# Patient Record
Sex: FEMALE | Race: WHITE | Employment: UNEMPLOYED | URBAN - METROPOLITAN AREA
[De-identification: names, ages, dates, MRNs, and addresses within clinical notes are randomized per-mention and may not be internally consistent; named-entity substitution may affect disease eponyms.]

---

## 2017-05-25 ENCOUNTER — OFFICE VISIT (OUTPATIENT)
Dept: ORTHOPEDIC SURGERY | Age: 47
End: 2017-05-25

## 2017-05-25 VITALS
SYSTOLIC BLOOD PRESSURE: 138 MMHG | WEIGHT: 160 LBS | DIASTOLIC BLOOD PRESSURE: 89 MMHG | HEART RATE: 130 BPM | BODY MASS INDEX: 23.7 KG/M2 | HEIGHT: 69 IN

## 2017-05-25 DIAGNOSIS — S82.811A CLOSED TORUS FRACTURE OF PROXIMAL END OF RIGHT FIBULA, INITIAL ENCOUNTER: Primary | ICD-10-CM

## 2017-05-25 PROCEDURE — 27780 TREATMENT OF FIBULA FRACTURE: CPT | Performed by: ORTHOPAEDIC SURGERY

## 2017-05-25 PROCEDURE — 99203 OFFICE O/P NEW LOW 30 MIN: CPT | Performed by: ORTHOPAEDIC SURGERY

## 2017-05-27 PROBLEM — S82.811A: Status: ACTIVE | Noted: 2017-05-27

## 2017-05-31 ENCOUNTER — TELEPHONE (OUTPATIENT)
Dept: ORTHOPEDIC SURGERY | Age: 47
End: 2017-05-31

## 2017-06-27 ENCOUNTER — TELEPHONE (OUTPATIENT)
Dept: ORTHOPEDIC SURGERY | Age: 47
End: 2017-06-27

## 2017-07-06 ENCOUNTER — OFFICE VISIT (OUTPATIENT)
Dept: ORTHOPEDIC SURGERY | Age: 47
End: 2017-07-06

## 2017-07-06 VITALS — BODY MASS INDEX: 23.7 KG/M2 | HEIGHT: 69 IN | WEIGHT: 160 LBS | RESPIRATION RATE: 16 BRPM | HEART RATE: 120 BPM

## 2017-07-06 DIAGNOSIS — S82.811A CLOSED TORUS FRACTURE OF PROXIMAL END OF RIGHT FIBULA, INITIAL ENCOUNTER: Primary | ICD-10-CM

## 2017-07-06 PROCEDURE — 73562 X-RAY EXAM OF KNEE 3: CPT | Performed by: NURSE PRACTITIONER

## 2017-07-06 PROCEDURE — 99024 POSTOP FOLLOW-UP VISIT: CPT | Performed by: NURSE PRACTITIONER

## 2021-07-15 ENCOUNTER — APPOINTMENT (OUTPATIENT)
Dept: GENERAL RADIOLOGY | Age: 51
DRG: 139 | End: 2021-07-15
Payer: MEDICAID

## 2021-07-15 ENCOUNTER — HOSPITAL ENCOUNTER (INPATIENT)
Age: 51
LOS: 2 days | Discharge: HOME | DRG: 139 | End: 2021-07-17
Attending: EMERGENCY MEDICINE | Admitting: HOSPITALIST
Payer: MEDICAID

## 2021-07-15 ENCOUNTER — APPOINTMENT (OUTPATIENT)
Dept: CT IMAGING | Age: 51
DRG: 139 | End: 2021-07-15
Payer: MEDICAID

## 2021-07-15 DIAGNOSIS — J18.9 PNEUMONIA OF BOTH LOWER LOBES DUE TO INFECTIOUS ORGANISM: Primary | ICD-10-CM

## 2021-07-15 LAB
ANION GAP SERPL CALCULATED.3IONS-SCNC: 16 MMOL/L (ref 3–16)
BASOPHILS ABSOLUTE: 0 K/UL (ref 0–0.2)
BASOPHILS RELATIVE PERCENT: 0.1 %
BUN BLDV-MCNC: 8 MG/DL (ref 7–20)
CALCIUM SERPL-MCNC: 9.1 MG/DL (ref 8.3–10.6)
CHLORIDE BLD-SCNC: 91 MMOL/L (ref 99–110)
CO2: 30 MMOL/L (ref 21–32)
CREAT SERPL-MCNC: 0.8 MG/DL (ref 0.6–1.1)
D DIMER: >1.95 UG/ML FEU
EOSINOPHILS ABSOLUTE: 0.1 K/UL (ref 0–0.6)
EOSINOPHILS RELATIVE PERCENT: 0.7 %
GFR AFRICAN AMERICAN: >60
GFR NON-AFRICAN AMERICAN: >60
GLUCOSE BLD-MCNC: 139 MG/DL (ref 70–99)
HCT VFR BLD CALC: 53.5 % (ref 36–48)
HEMOGLOBIN: 17.8 G/DL (ref 12–16)
LACTIC ACID: 3.8 MMOL/L (ref 0.4–2)
LYMPHOCYTES ABSOLUTE: 1.8 K/UL (ref 1–5.1)
LYMPHOCYTES RELATIVE PERCENT: 19.8 %
MCH RBC QN AUTO: 33 PG (ref 26–34)
MCHC RBC AUTO-ENTMCNC: 33.2 G/DL (ref 31–36)
MCV RBC AUTO: 99.3 FL (ref 80–100)
MONOCYTES ABSOLUTE: 0.6 K/UL (ref 0–1.3)
MONOCYTES RELATIVE PERCENT: 6.6 %
NEUTROPHILS ABSOLUTE: 6.7 K/UL (ref 1.7–7.7)
NEUTROPHILS RELATIVE PERCENT: 72.8 %
PDW BLD-RTO: 15.5 % (ref 12.4–15.4)
PLATELET # BLD: 266 K/UL (ref 135–450)
PMV BLD AUTO: 8.1 FL (ref 5–10.5)
POTASSIUM REFLEX MAGNESIUM: 3.6 MMOL/L (ref 3.5–5.1)
RBC # BLD: 5.38 M/UL (ref 4–5.2)
SODIUM BLD-SCNC: 137 MMOL/L (ref 136–145)
WBC # BLD: 9.2 K/UL (ref 4–11)

## 2021-07-15 PROCEDURE — 6360000002 HC RX W HCPCS: Performed by: EMERGENCY MEDICINE

## 2021-07-15 PROCEDURE — 85379 FIBRIN DEGRADATION QUANT: CPT

## 2021-07-15 PROCEDURE — 2580000003 HC RX 258: Performed by: EMERGENCY MEDICINE

## 2021-07-15 PROCEDURE — 71260 CT THORAX DX C+: CPT

## 2021-07-15 PROCEDURE — 96361 HYDRATE IV INFUSION ADD-ON: CPT

## 2021-07-15 PROCEDURE — 80048 BASIC METABOLIC PNL TOTAL CA: CPT

## 2021-07-15 PROCEDURE — 71046 X-RAY EXAM CHEST 2 VIEWS: CPT

## 2021-07-15 PROCEDURE — 99285 EMERGENCY DEPT VISIT HI MDM: CPT

## 2021-07-15 PROCEDURE — 1200000000 HC SEMI PRIVATE

## 2021-07-15 PROCEDURE — 96365 THER/PROPH/DIAG IV INF INIT: CPT

## 2021-07-15 PROCEDURE — 6360000004 HC RX CONTRAST MEDICATION: Performed by: EMERGENCY MEDICINE

## 2021-07-15 PROCEDURE — G0378 HOSPITAL OBSERVATION PER HR: HCPCS

## 2021-07-15 PROCEDURE — 87040 BLOOD CULTURE FOR BACTERIA: CPT

## 2021-07-15 PROCEDURE — 36415 COLL VENOUS BLD VENIPUNCTURE: CPT

## 2021-07-15 PROCEDURE — 96367 TX/PROPH/DG ADDL SEQ IV INF: CPT

## 2021-07-15 PROCEDURE — 93005 ELECTROCARDIOGRAM TRACING: CPT | Performed by: EMERGENCY MEDICINE

## 2021-07-15 PROCEDURE — 85025 COMPLETE CBC W/AUTO DIFF WBC: CPT

## 2021-07-15 PROCEDURE — 83605 ASSAY OF LACTIC ACID: CPT

## 2021-07-15 RX ORDER — OMEGA-3 FATTY ACIDS/FISH OIL 300-1000MG
400 CAPSULE ORAL EVERY 6 HOURS PRN
COMMUNITY

## 2021-07-15 RX ORDER — ALBUTEROL SULFATE 90 UG/1
2 AEROSOL, METERED RESPIRATORY (INHALATION) EVERY 6 HOURS PRN
COMMUNITY
Start: 2021-06-07

## 2021-07-15 RX ORDER — BUDESONIDE AND FORMOTEROL FUMARATE DIHYDRATE 160; 4.5 UG/1; UG/1
2 AEROSOL RESPIRATORY (INHALATION) 2 TIMES DAILY
COMMUNITY
Start: 2021-06-07

## 2021-07-15 RX ORDER — ASPIRIN 81 MG/1
81 TABLET ORAL DAILY
COMMUNITY

## 2021-07-15 RX ORDER — DULOXETIN HYDROCHLORIDE 60 MG/1
60 CAPSULE, DELAYED RELEASE ORAL DAILY
COMMUNITY
Start: 2021-06-07

## 2021-07-15 RX ORDER — OMEPRAZOLE 40 MG/1
40 CAPSULE, DELAYED RELEASE ORAL DAILY
COMMUNITY
Start: 2021-06-07

## 2021-07-15 RX ORDER — HYDROXYZINE PAMOATE 50 MG/1
50 CAPSULE ORAL 3 TIMES DAILY PRN
COMMUNITY
Start: 2021-06-07

## 2021-07-15 RX ORDER — 0.9 % SODIUM CHLORIDE 0.9 %
1000 INTRAVENOUS SOLUTION INTRAVENOUS ONCE
Status: COMPLETED | OUTPATIENT
Start: 2021-07-15 | End: 2021-07-15

## 2021-07-15 RX ADMIN — CEFTRIAXONE 1000 MG: 1 INJECTION, POWDER, FOR SOLUTION INTRAMUSCULAR; INTRAVENOUS at 23:11

## 2021-07-15 RX ADMIN — SODIUM CHLORIDE 1000 ML: 9 INJECTION, SOLUTION INTRAVENOUS at 21:39

## 2021-07-15 RX ADMIN — AZITHROMYCIN MONOHYDRATE 500 MG: 500 INJECTION, POWDER, LYOPHILIZED, FOR SOLUTION INTRAVENOUS at 23:44

## 2021-07-15 RX ADMIN — IOPAMIDOL 100 ML: 755 INJECTION, SOLUTION INTRAVENOUS at 22:14

## 2021-07-15 ASSESSMENT — PAIN DESCRIPTION - DESCRIPTORS: DESCRIPTORS: ACHING

## 2021-07-15 ASSESSMENT — PAIN DESCRIPTION - LOCATION: LOCATION: HEAD

## 2021-07-15 ASSESSMENT — PAIN DESCRIPTION - PAIN TYPE: TYPE: ACUTE PAIN

## 2021-07-15 ASSESSMENT — PAIN SCALES - GENERAL: PAINLEVEL_OUTOF10: 7

## 2021-07-15 ASSESSMENT — PAIN DESCRIPTION - FREQUENCY: FREQUENCY: CONTINUOUS

## 2021-07-15 NOTE — Clinical Note
Patient Class: Inpatient [101]   REQUIRED: Diagnosis: PNA (pneumonia) [214209]   Estimated Length of Stay: Estimated stay of more than 2 midnights   Admitting Provider: Abilio Mckeon [[id number]]   Preferred Department: covid R/O

## 2021-07-16 LAB
ALBUMIN SERPL-MCNC: 3.6 G/DL (ref 3.4–5)
ALP BLD-CCNC: 141 U/L (ref 40–129)
ALT SERPL-CCNC: 22 U/L (ref 10–40)
ANION GAP SERPL CALCULATED.3IONS-SCNC: 17 MMOL/L (ref 3–16)
AST SERPL-CCNC: 51 U/L (ref 15–37)
BASOPHILS ABSOLUTE: 0.1 K/UL (ref 0–0.2)
BASOPHILS RELATIVE PERCENT: 0.5 %
BILIRUB SERPL-MCNC: 2 MG/DL (ref 0–1)
BILIRUBIN DIRECT: 0.4 MG/DL (ref 0–0.3)
BILIRUBIN, INDIRECT: 1.6 MG/DL (ref 0–1)
BUN BLDV-MCNC: 7 MG/DL (ref 7–20)
CALCIUM SERPL-MCNC: 8.3 MG/DL (ref 8.3–10.6)
CHLORIDE BLD-SCNC: 93 MMOL/L (ref 99–110)
CO2: 26 MMOL/L (ref 21–32)
CREAT SERPL-MCNC: 0.6 MG/DL (ref 0.6–1.1)
EKG ATRIAL RATE: 120 BPM
EKG DIAGNOSIS: NORMAL
EKG P AXIS: 57 DEGREES
EKG P-R INTERVAL: 120 MS
EKG Q-T INTERVAL: 432 MS
EKG QRS DURATION: 74 MS
EKG QTC CALCULATION (BAZETT): 610 MS
EKG R AXIS: 33 DEGREES
EKG T AXIS: 89 DEGREES
EKG VENTRICULAR RATE: 120 BPM
EOSINOPHILS ABSOLUTE: 0 K/UL (ref 0–0.6)
EOSINOPHILS RELATIVE PERCENT: 0.5 %
FERRITIN: 392 NG/ML (ref 15–150)
GFR AFRICAN AMERICAN: >60
GFR NON-AFRICAN AMERICAN: >60
GLUCOSE BLD-MCNC: 106 MG/DL (ref 70–99)
HCT VFR BLD CALC: 49.7 % (ref 36–48)
HEMOGLOBIN: 17.2 G/DL (ref 12–16)
LACTIC ACID: 1.8 MMOL/L (ref 0.4–2)
LACTIC ACID: 3.4 MMOL/L (ref 0.4–2)
LACTIC ACID: 5.1 MMOL/L (ref 0.4–2)
LACTIC ACID: 5.4 MMOL/L (ref 0.4–2)
LYMPHOCYTES ABSOLUTE: 1.2 K/UL (ref 1–5.1)
LYMPHOCYTES RELATIVE PERCENT: 11.4 %
MAGNESIUM: 1 MG/DL (ref 1.8–2.4)
MAGNESIUM: 1.1 MG/DL (ref 1.8–2.4)
MCH RBC QN AUTO: 33.8 PG (ref 26–34)
MCHC RBC AUTO-ENTMCNC: 34.5 G/DL (ref 31–36)
MCV RBC AUTO: 97.7 FL (ref 80–100)
MONOCYTES ABSOLUTE: 0.4 K/UL (ref 0–1.3)
MONOCYTES RELATIVE PERCENT: 4.4 %
NEUTROPHILS ABSOLUTE: 8.4 K/UL (ref 1.7–7.7)
NEUTROPHILS RELATIVE PERCENT: 83.2 %
PDW BLD-RTO: 15.5 % (ref 12.4–15.4)
PLATELET # BLD: 179 K/UL (ref 135–450)
PMV BLD AUTO: 9 FL (ref 5–10.5)
POTASSIUM REFLEX MAGNESIUM: 3.1 MMOL/L (ref 3.5–5.1)
POTASSIUM SERPL-SCNC: 3.4 MMOL/L (ref 3.5–5.1)
PRO-BNP: 1748 PG/ML (ref 0–124)
PROCALCITONIN: 0.15 NG/ML (ref 0–0.15)
RBC # BLD: 5.09 M/UL (ref 4–5.2)
SARS-COV-2, NAAT: NOT DETECTED
SODIUM BLD-SCNC: 136 MMOL/L (ref 136–145)
TOTAL PROTEIN: 7.6 G/DL (ref 6.4–8.2)
WBC # BLD: 10.1 K/UL (ref 4–11)

## 2021-07-16 PROCEDURE — 94640 AIRWAY INHALATION TREATMENT: CPT

## 2021-07-16 PROCEDURE — 85025 COMPLETE CBC W/AUTO DIFF WBC: CPT

## 2021-07-16 PROCEDURE — 6360000002 HC RX W HCPCS: Performed by: INTERNAL MEDICINE

## 2021-07-16 PROCEDURE — 96372 THER/PROPH/DIAG INJ SC/IM: CPT

## 2021-07-16 PROCEDURE — 6370000000 HC RX 637 (ALT 250 FOR IP): Performed by: HOSPITALIST

## 2021-07-16 PROCEDURE — 96366 THER/PROPH/DIAG IV INF ADDON: CPT

## 2021-07-16 PROCEDURE — 82728 ASSAY OF FERRITIN: CPT

## 2021-07-16 PROCEDURE — 83735 ASSAY OF MAGNESIUM: CPT

## 2021-07-16 PROCEDURE — 80048 BASIC METABOLIC PNL TOTAL CA: CPT

## 2021-07-16 PROCEDURE — 83880 ASSAY OF NATRIURETIC PEPTIDE: CPT

## 2021-07-16 PROCEDURE — 96367 TX/PROPH/DG ADDL SEQ IV INF: CPT

## 2021-07-16 PROCEDURE — 1200000000 HC SEMI PRIVATE

## 2021-07-16 PROCEDURE — 2580000003 HC RX 258: Performed by: INTERNAL MEDICINE

## 2021-07-16 PROCEDURE — 6370000000 HC RX 637 (ALT 250 FOR IP): Performed by: INTERNAL MEDICINE

## 2021-07-16 PROCEDURE — 84132 ASSAY OF SERUM POTASSIUM: CPT

## 2021-07-16 PROCEDURE — 6360000002 HC RX W HCPCS: Performed by: HOSPITALIST

## 2021-07-16 PROCEDURE — 87635 SARS-COV-2 COVID-19 AMP PRB: CPT

## 2021-07-16 PROCEDURE — 2580000003 HC RX 258: Performed by: HOSPITALIST

## 2021-07-16 PROCEDURE — 83605 ASSAY OF LACTIC ACID: CPT

## 2021-07-16 PROCEDURE — 96361 HYDRATE IV INFUSION ADD-ON: CPT

## 2021-07-16 PROCEDURE — 36415 COLL VENOUS BLD VENIPUNCTURE: CPT

## 2021-07-16 PROCEDURE — 93010 ELECTROCARDIOGRAM REPORT: CPT | Performed by: INTERNAL MEDICINE

## 2021-07-16 PROCEDURE — 84145 PROCALCITONIN (PCT): CPT

## 2021-07-16 PROCEDURE — 80076 HEPATIC FUNCTION PANEL: CPT

## 2021-07-16 PROCEDURE — 94760 N-INVAS EAR/PLS OXIMETRY 1: CPT

## 2021-07-16 PROCEDURE — G0378 HOSPITAL OBSERVATION PER HR: HCPCS

## 2021-07-16 PROCEDURE — 96375 TX/PRO/DX INJ NEW DRUG ADDON: CPT

## 2021-07-16 RX ORDER — HYDRALAZINE HYDROCHLORIDE 20 MG/ML
10 INJECTION INTRAMUSCULAR; INTRAVENOUS EVERY 6 HOURS PRN
Status: DISCONTINUED | OUTPATIENT
Start: 2021-07-16 | End: 2021-07-17 | Stop reason: HOSPADM

## 2021-07-16 RX ORDER — CEFUROXIME AXETIL 250 MG/1
500 TABLET ORAL EVERY 12 HOURS SCHEDULED
Status: DISCONTINUED | OUTPATIENT
Start: 2021-07-16 | End: 2021-07-17 | Stop reason: HOSPADM

## 2021-07-16 RX ORDER — ONDANSETRON 2 MG/ML
4 INJECTION INTRAMUSCULAR; INTRAVENOUS EVERY 6 HOURS PRN
Status: DISCONTINUED | OUTPATIENT
Start: 2021-07-16 | End: 2021-07-17 | Stop reason: HOSPADM

## 2021-07-16 RX ORDER — CEFUROXIME AXETIL 500 MG/1
500 TABLET ORAL EVERY 12 HOURS SCHEDULED
Qty: 20 TABLET | Refills: 0 | Status: SHIPPED | OUTPATIENT
Start: 2021-07-16 | End: 2021-07-26

## 2021-07-16 RX ORDER — SODIUM CHLORIDE 9 MG/ML
INJECTION, SOLUTION INTRAVENOUS CONTINUOUS
Status: DISCONTINUED | OUTPATIENT
Start: 2021-07-16 | End: 2021-07-17 | Stop reason: HOSPADM

## 2021-07-16 RX ORDER — PANTOPRAZOLE SODIUM 40 MG/1
40 TABLET, DELAYED RELEASE ORAL
Status: DISCONTINUED | OUTPATIENT
Start: 2021-07-16 | End: 2021-07-17 | Stop reason: HOSPADM

## 2021-07-16 RX ORDER — 0.9 % SODIUM CHLORIDE 0.9 %
500 INTRAVENOUS SOLUTION INTRAVENOUS ONCE
Status: COMPLETED | OUTPATIENT
Start: 2021-07-16 | End: 2021-07-16

## 2021-07-16 RX ORDER — SODIUM CHLORIDE 0.9 % (FLUSH) 0.9 %
5-40 SYRINGE (ML) INJECTION PRN
Status: DISCONTINUED | OUTPATIENT
Start: 2021-07-16 | End: 2021-07-17 | Stop reason: HOSPADM

## 2021-07-16 RX ORDER — ASPIRIN 81 MG/1
81 TABLET ORAL DAILY
Status: DISCONTINUED | OUTPATIENT
Start: 2021-07-16 | End: 2021-07-17 | Stop reason: HOSPADM

## 2021-07-16 RX ORDER — ALBUTEROL SULFATE 90 UG/1
2 AEROSOL, METERED RESPIRATORY (INHALATION) EVERY 6 HOURS PRN
Status: DISCONTINUED | OUTPATIENT
Start: 2021-07-16 | End: 2021-07-17 | Stop reason: HOSPADM

## 2021-07-16 RX ORDER — POLYETHYLENE GLYCOL 3350 17 G/17G
17 POWDER, FOR SOLUTION ORAL DAILY PRN
Status: DISCONTINUED | OUTPATIENT
Start: 2021-07-16 | End: 2021-07-17 | Stop reason: HOSPADM

## 2021-07-16 RX ORDER — AMITRIPTYLINE HYDROCHLORIDE 10 MG/1
10 TABLET, FILM COATED ORAL NIGHTLY
Status: DISCONTINUED | OUTPATIENT
Start: 2021-07-16 | End: 2021-07-17 | Stop reason: HOSPADM

## 2021-07-16 RX ORDER — ACETAMINOPHEN 325 MG/1
650 TABLET ORAL EVERY 6 HOURS PRN
Status: DISCONTINUED | OUTPATIENT
Start: 2021-07-16 | End: 2021-07-17 | Stop reason: HOSPADM

## 2021-07-16 RX ORDER — DEXAMETHASONE SODIUM PHOSPHATE 10 MG/ML
6 INJECTION, SOLUTION INTRAMUSCULAR; INTRAVENOUS EVERY 24 HOURS
Status: DISCONTINUED | OUTPATIENT
Start: 2021-07-16 | End: 2021-07-17 | Stop reason: HOSPADM

## 2021-07-16 RX ORDER — MAGNESIUM SULFATE 1 G/100ML
1000 INJECTION INTRAVENOUS PRN
Status: DISCONTINUED | OUTPATIENT
Start: 2021-07-16 | End: 2021-07-17 | Stop reason: HOSPADM

## 2021-07-16 RX ORDER — SODIUM CHLORIDE 9 MG/ML
25 INJECTION, SOLUTION INTRAVENOUS PRN
Status: DISCONTINUED | OUTPATIENT
Start: 2021-07-16 | End: 2021-07-17 | Stop reason: HOSPADM

## 2021-07-16 RX ORDER — HYDROXYZINE PAMOATE 25 MG/1
50 CAPSULE ORAL 3 TIMES DAILY PRN
Status: DISCONTINUED | OUTPATIENT
Start: 2021-07-16 | End: 2021-07-17 | Stop reason: HOSPADM

## 2021-07-16 RX ORDER — POTASSIUM CHLORIDE 7.45 MG/ML
10 INJECTION INTRAVENOUS PRN
Status: DISCONTINUED | OUTPATIENT
Start: 2021-07-16 | End: 2021-07-17 | Stop reason: HOSPADM

## 2021-07-16 RX ORDER — POTASSIUM CHLORIDE 20 MEQ/1
40 TABLET, EXTENDED RELEASE ORAL PRN
Status: DISCONTINUED | OUTPATIENT
Start: 2021-07-16 | End: 2021-07-17 | Stop reason: HOSPADM

## 2021-07-16 RX ORDER — ACETAMINOPHEN 650 MG/1
650 SUPPOSITORY RECTAL EVERY 6 HOURS PRN
Status: DISCONTINUED | OUTPATIENT
Start: 2021-07-16 | End: 2021-07-17 | Stop reason: HOSPADM

## 2021-07-16 RX ORDER — ONDANSETRON 4 MG/1
4 TABLET, ORALLY DISINTEGRATING ORAL EVERY 8 HOURS PRN
Status: DISCONTINUED | OUTPATIENT
Start: 2021-07-16 | End: 2021-07-17 | Stop reason: HOSPADM

## 2021-07-16 RX ORDER — BUDESONIDE AND FORMOTEROL FUMARATE DIHYDRATE 160; 4.5 UG/1; UG/1
2 AEROSOL RESPIRATORY (INHALATION) 2 TIMES DAILY
Status: DISCONTINUED | OUTPATIENT
Start: 2021-07-16 | End: 2021-07-17 | Stop reason: HOSPADM

## 2021-07-16 RX ORDER — SODIUM CHLORIDE 0.9 % (FLUSH) 0.9 %
5-40 SYRINGE (ML) INJECTION EVERY 12 HOURS SCHEDULED
Status: DISCONTINUED | OUTPATIENT
Start: 2021-07-16 | End: 2021-07-17 | Stop reason: HOSPADM

## 2021-07-16 RX ORDER — DULOXETIN HYDROCHLORIDE 60 MG/1
60 CAPSULE, DELAYED RELEASE ORAL DAILY
Status: DISCONTINUED | OUTPATIENT
Start: 2021-07-16 | End: 2021-07-17 | Stop reason: HOSPADM

## 2021-07-16 RX ADMIN — ASPIRIN 81 MG: 81 TABLET, COATED ORAL at 08:26

## 2021-07-16 RX ADMIN — ENOXAPARIN SODIUM 30 MG: 30 INJECTION SUBCUTANEOUS at 08:26

## 2021-07-16 RX ADMIN — DEXAMETHASONE SODIUM PHOSPHATE 6 MG: 10 INJECTION, SOLUTION INTRAMUSCULAR; INTRAVENOUS at 02:21

## 2021-07-16 RX ADMIN — MAGNESIUM SULFATE HEPTAHYDRATE 1000 MG: 1 INJECTION, SOLUTION INTRAVENOUS at 14:10

## 2021-07-16 RX ADMIN — SODIUM CHLORIDE: 9 INJECTION, SOLUTION INTRAVENOUS at 18:28

## 2021-07-16 RX ADMIN — SODIUM CHLORIDE, PRESERVATIVE FREE 10 ML: 5 INJECTION INTRAVENOUS at 10:59

## 2021-07-16 RX ADMIN — HYDRALAZINE HYDROCHLORIDE 10 MG: 20 INJECTION INTRAMUSCULAR; INTRAVENOUS at 13:47

## 2021-07-16 RX ADMIN — POTASSIUM BICARBONATE 40 MEQ: 782 TABLET, EFFERVESCENT ORAL at 12:51

## 2021-07-16 RX ADMIN — PANTOPRAZOLE SODIUM 40 MG: 40 TABLET, DELAYED RELEASE ORAL at 05:26

## 2021-07-16 RX ADMIN — DULOXETINE HYDROCHLORIDE 60 MG: 60 CAPSULE, DELAYED RELEASE ORAL at 08:26

## 2021-07-16 RX ADMIN — BUDESONIDE AND FORMOTEROL FUMARATE DIHYDRATE 2 PUFF: 160; 4.5 AEROSOL RESPIRATORY (INHALATION) at 07:34

## 2021-07-16 RX ADMIN — CEFUROXIME AXETIL 500 MG: 250 TABLET ORAL at 20:11

## 2021-07-16 RX ADMIN — SODIUM CHLORIDE 500 ML: 9 INJECTION, SOLUTION INTRAVENOUS at 12:49

## 2021-07-16 RX ADMIN — ACETAMINOPHEN 650 MG: 325 TABLET ORAL at 02:32

## 2021-07-16 RX ADMIN — MAGNESIUM SULFATE HEPTAHYDRATE 1000 MG: 1 INJECTION, SOLUTION INTRAVENOUS at 12:52

## 2021-07-16 RX ADMIN — MAGNESIUM SULFATE HEPTAHYDRATE 1000 MG: 1 INJECTION, SOLUTION INTRAVENOUS at 18:00

## 2021-07-16 RX ADMIN — ENOXAPARIN SODIUM 30 MG: 30 INJECTION SUBCUTANEOUS at 20:11

## 2021-07-16 RX ADMIN — BUDESONIDE AND FORMOTEROL FUMARATE DIHYDRATE 2 PUFF: 160; 4.5 AEROSOL RESPIRATORY (INHALATION) at 20:51

## 2021-07-16 RX ADMIN — AMITRIPTYLINE HYDROCHLORIDE 10 MG: 10 TABLET, FILM COATED ORAL at 20:11

## 2021-07-16 RX ADMIN — MAGNESIUM SULFATE HEPTAHYDRATE 1000 MG: 1 INJECTION, SOLUTION INTRAVENOUS at 15:27

## 2021-07-16 ASSESSMENT — PAIN SCALES - GENERAL
PAINLEVEL_OUTOF10: 4
PAINLEVEL_OUTOF10: 0
PAINLEVEL_OUTOF10: 3
PAINLEVEL_OUTOF10: 0

## 2021-07-16 ASSESSMENT — PAIN DESCRIPTION - LOCATION: LOCATION: HEAD

## 2021-07-16 ASSESSMENT — PAIN DESCRIPTION - PAIN TYPE: TYPE: ACUTE PAIN

## 2021-07-16 ASSESSMENT — PAIN DESCRIPTION - DESCRIPTORS: DESCRIPTORS: ACHING

## 2021-07-16 NOTE — ED PROVIDER NOTES
eMERGENCY dEPARTMENT eNCOUnter      Pt Name: Wayne Daigle  MRN: [id number]  [place] 1970  Date of evaluation: 7/15/2021  Provider: Hawa Lopez MD     [address]       Chief Complaint   Patient presents with    Shortness of Breath     cough x 2 weeks. Started feeling sob with chills past two day. Hx ASTHMA          HISTORY OF PRESENT ILLNESS   (Location/Symptom, Timing/Onset,Context/Setting, Quality, Duration, Modifying Factors, Severity) Note limiting factors. HPI    Wayne Daigle is a 45 y.o. female who presents to the emergency department with shortness of breath and cough for 2 weeks. Patient states she has been feeling weak and short of breath with chills for the past couple days. Patient is concerned because she has history of asthma and has been having difficulty breathing. There has been no fever. Patient denies any exposure patient however has never been tested for Covid and has never received the Covid vaccine. Patient presents with tachycardia afebrile with some hypotension. Patient has comorbidity of the cardiac surgery secondary to a aortic dissection and also patient had a previous brain aneurysm that was repair about four 5 years ago. Patient states she is been using her inhalers without relief. She denies any exposure to Covid. No family members have been sick. Nursing Notes were reviewed. REVIEW OFSYSTEMS    (2+ for level 4; 10+ for level 5)   Review of Systems    General: No fevers, positive chills or night sweats, No weight loss    Head:  No Sore throat,  No Ear Pain    Chest:  Nontender. Positive dry nonproductive cough, positive SOB, no chest Pain    GI: No abdominal pain or vomiting    : No dysuria or hematuria    Musculoskeletal: No unrelenting pain or night pain    Neurologic: No bowel or bladder incontinence, No saddle anesthesia, No leg weakness    All other systems reviewed and are negative.         PAST MEDICAL HISTORY     Past Medical History: Diagnosis Date    Asthma     Headache     Hypertension        SURGICAL HISTORY       Past Surgical History:   Procedure Laterality Date    BRAIN SURGERY      CARDIAC SURGERY      FRACTURE SURGERY Left     Ankle    TUBAL LIGATION         CURRENT MEDICATIONS       Previous Medications    ALBUTEROL SULFATE  (90 BASE) MCG/ACT INHALER    Inhale 2 puffs into the lungs every 6 hours as needed for Wheezing    ALBUTEROL SULFATE  (90 BASE) MCG/ACT INHALER    Inhale 2 puffs into the lungs every 6 hours as needed    AMITRIPTYLINE (ELAVIL) 10 MG TABLET    Take 10 mg by mouth nightly    ASPIRIN 81 MG EC TABLET    Take 81 mg by mouth daily    BUDESONIDE-FORMOTEROL (SYMBICORT) 160-4.5 MCG/ACT AERO    Inhale 2 puffs into the lungs 2 times daily    DULOXETINE (CYMBALTA) 60 MG EXTENDED RELEASE CAPSULE    Take 60 mg by mouth daily    HYDROXYZINE (VISTARIL) 50 MG CAPSULE    Take 50 mg by mouth 3 times daily as needed    IBUPROFEN (ADVIL;MOTRIN) 200 MG CAPS    Take 400 mg by mouth every 6 hours as needed    OMEPRAZOLE (PRILOSEC) 40 MG DELAYED RELEASE CAPSULE    Take 40 mg by mouth daily       ALLERGIES     Eggs or egg-derived products and Penicillins    FAMILY HISTORY     History reviewed. No pertinent family history.      SOCIAL HISTORY       Social History     Socioeconomic History    Marital status:      Spouse name: None    Number of children: None    Years of education: None    Highest education level: None   Occupational History    Occupation: Nurse Aide   Tobacco Use    Smoking status: Former Smoker     Packs/day: 0.50     Types: Cigarettes     Quit date: 2021     Years since quittin.1    Smokeless tobacco: Never Used   Substance and Sexual Activity    Alcohol use: Yes     Comment: 2 DAYS WEEK     Drug use: No    Sexual activity: None   Other Topics Concern    None   Social History Narrative    None     Social Determinants of Health     Financial Resource Strain:     Difficulty of Paying Living Expenses:    Food Insecurity:     Worried About Running Out of Food in the Last Year:     [address] in the Last Year:    Transportation Needs:     Lack of Transportation (Medical):  Lack of Transportation (Non-Medical):    Physical Activity:     Days of Exercise per Week:     Minutes of Exercise per Session:    Stress:     Feeling of Stress :    Social Connections:     Frequency of Communication with Friends and Family:     Frequency of Social Gatherings with Friends and Family:     Attends Voodoo Services:     Active Member of Clubs or Organizations:     Attends Club or Organization Meetings:     Marital Status:    Intimate Partner Violence:     Fear of Current or Ex-Partner:     Emotionally Abused:     Physically Abused:     Sexually Abused:        SCREENINGS    Fatoumata Coma Scale  Eye Opening: Spontaneous  Best Verbal Response: Oriented  Best Motor Response: Obeys commands  Fatoumata Coma Scale Score: 15      PHYSICAL EXAM    (up to 7 for level 4, 8 or more for level 5)     ED Triage Vitals [07/15/21 2048]   BP Temp Temp Source Pulse Resp SpO2 Height Weight   (!) 170/125 98.6 °F (37 °C) Oral 122 24 96 % 5' 7\" (1.702 m) 169 lb 12.1 oz (77 kg)       Physical Exam    General: Alert and awake ×3. Nontoxic appearance. Well-developed well-nourished in moderate respiratory distress. HEENT: Normocephalic atraumatic. Neck is supple. Airway intact. No adenopathy  Cardiac: Rapid rate and normal rhythm with no murmurs rubs or gallops  Pulmonary: Lungs are clear in all lung fields but I hear some bibasilar rhonchi. No wheezing. No Rales. Abdomen: Soft and nontender. Negative hepatosplenomegaly. Bowel sounds are active  Extremities: Moving all extremities. No calf tenderness. Peripheral pulses all intact. No peripheral edema. No calf tenderness. Skin: No skin lesions. No rashes  Neurologic: Cranial nerves II through XII was grossly intact.   Nonfocal neurological exam  Psychiatric: Patient is pleasant. Mood is appropriate. DIAGNOSTIC RESULTS     EKG (Per Emergency Physician):     Sinus tachycardia at a rate of 120 nonspecific ST-T wave changes no ischemia noted. RADIOLOGY (Per Emergency Physician): Interpretation per the Radiologist below, if available at the time of this note:  XR CHEST (2 VW)    Result Date: 7/15/2021  EXAMINATION: TWO XRAY VIEWS OF THE CHEST 7/15/2021 9:21 pm COMPARISON: 08/21/2017 HISTORY: ORDERING SYSTEM PROVIDED HISTORY: SOB TECHNOLOGIST PROVIDED HISTORY: Reason for exam:->SOB Reason for Exam: sob Acuity: Acute Type of Exam: Initial Relevant Medical/Surgical History: asthma, aortic repair sx FINDINGS: Sternotomy wires are present. Cardiac silhouette is normal.  Thoracic aortic contour is stable. Sussy are normal.  There is no focal airspace disease or pleural effusion. No acute cardiopulmonary disease. CT CHEST PULMONARY EMBOLISM W CONTRAST    Result Date: 7/15/2021  EXAMINATION: CTA OF THE CHEST 7/15/2021 10:14 pm TECHNIQUE: CTA of the chest was performed after the administration of intravenous contrast.  Multiplanar reformatted images are provided for review. MIP images are provided for review. Dose modulation, iterative reconstruction, and/or weight based adjustment of the mA/kV was utilized to reduce the radiation dose to as low as reasonably achievable.  COMPARISON: PA and lateral chest from 07/15/2021 HISTORY: ORDERING SYSTEM PROVIDED HISTORY: SOB TECHNOLOGIST PROVIDED HISTORY: Reason for exam:->SOB Decision Support Exception - unselect if not a suspected or confirmed emergency medical condition->Emergency Medical Condition (MA) Reason for Exam: sob, elevated D-dimer Acuity: Acute Type of Exam: Initial Relevant Medical/Surgical History: asthma, aorta repair sx 67-year-old female with shortness of breath and elevated D-dimer FINDINGS: Pulmonary Arteries: No obvious filling defect in the pulmonary arterial vasculature that meets the criteria for pulmonary embolus. Mediastinum: Aortic valvular prosthesis. Prior median sternotomy and CABG. Atheromatous plaque and atherosclerotic calcification of the thoracic aorta and branch vasculature. Visualized thyroid gland grossly unremarkable in appearance. No periaortic or mediastinal hemorrhage. Coronary artery disease. No pericardial or pleural effusions. No axillary, mediastinal, or hilar lymphadenopathy. Calcified mediastinal and right hilar lymph nodes. Calcified subcarinal lymph nodes. Rim of hyperdensity surrounding the ascending thoracic aorta likely related to prior aortic repair surgery. Lungs/pleura: Trachea and proximal central airways appear patent. Mild biapical pleuroparenchymal scarring. Scattered ground-glass and tree-in-bud opacities within the right upper lobe. No pneumothorax. Tree-in-bud opacities are seen within the medial right lower lobe with ground-glass opacities in the upper/superior segment of the right lower lobe. Respiratory motion is seen throughout the left lung. Left lung is relatively clear. Upper Abdomen: Diffuse fatty infiltration of the liver. Atherosclerotic calcification of the upper abdominal aorta and branch vasculature. Small hiatal hernia. Soft Tissues/Bones: Mild diffuse degenerative changes throughout the spine. 1. Findings above consistent with multifocal pneumonia, essentially within the right lung. Follow-up is recommended to document resolution. 2. No clear evidence for central pulmonary embolus. 3. Aortic valvular prosthesis. Evidence of prior aortic repair. Prior median sternotomy and CABG. Coronary artery disease. Atheromatous plaque and atherosclerotic calcification of the aorta and branch vasculature. 4. Old granulomatous disease. 5. Fatty liver. 6. Small hiatal hernia.        ED BEDSIDE ULTRASOUND:   Performed by ED Physician - none    LABS:  Labs Reviewed   CBC WITH AUTO DIFFERENTIAL - Abnormal; Notable for the following components:       Result Value    RBC 5.38 (*)     Hemoglobin 17.8 (*)     Hematocrit 53.5 (*)     RDW 15.5 (*)     All other components within normal limits    Narrative:     Performed at:  [facility]  [address]   Phone [phone]   BASIC METABOLIC PANEL W/ REFLEX TO MG FOR LOW K - Abnormal; Notable for the following components:    Chloride 91 (*)     Glucose 139 (*)     All other components within normal limits    Narrative:     Performed at:  [facility]  [address]   Phone [phone]   D-DIMER, QUANTITATIVE - Abnormal; Notable for the following components:    D-Dimer, Quant >1.95 (*)     All other components within normal limits    Narrative:     Performed at:  [facility]  [address]   Phone [phone]   LACTIC ACID, PLASMA - Abnormal; Notable for the following components:    Lactic Acid 3.8 (*)     All other components within normal limits    Narrative:     Performed at:  [facility]  [address]   Phone [phone]   CULTURE, BLOOD 1   CULTURE, BLOOD 2        All other labs were within normal range or not returned as of this dictation.       Procedures      EMERGENCY DEPARTMENT COURSE and DIFFERENTIAL DIAGNOSIS/MDM:   Vitals:    Vitals:    07/15/21 2200 07/15/21 2230 07/15/21 2330 07/16/21 0000   BP: (!) 153/100 (!) 145/108 (!) 160/111 (!) 160/125   Pulse: 101 112 97 107   Resp: 13 17 15 16   Temp:       TempSrc:       SpO2: 97% 96% 98% 96%   Weight:       Height:           Medications   azithromycin (ZITHROMAX) 500 mg in D5W 250ml addavial (500 mg Intravenous New Bag 7/15/21 2344)   0.9 % sodium chloride bolus (0 mLs Intravenous Stopped 7/15/21 2256)   iopamidol (ISOVUE-370) 76 % injection 100 mL (100 mLs Intravenous Given 7/15/21 3528)   cefTRIAXone (ROCEPHIN) 1000 mg IVPB in 50 mL D5W minibag (0 mg Intravenous Stopped 7/15/21 4040)       MDM. Patient is 79-year-old female been sick for couple weeks but for the past couple days been short of breath. His exam is consistent for a pneumonia I hear some rhonchi noted. Her chest x-ray was clear however the CAT scan that was obtained because I was concerned for PE with elevated D-dimer did not show any evidence of obstruction or PE but did show multifocal pneumonia consistent with some groundglass appearance. Given she has never been tested and have not received the vaccine she may be at risk for Covid pneumonia. Patient is not hypoxic she still tachycardic her lactic acid is elevated at 3.8. Patient given a liter of fluids cardio remains blood pressure is elevated patient is not on any antihypertensive. Patient does have history of hypertension though. We will discuss with hospitalist for admission and further treatment. Cultures have been obtained patient will be started on Zithromax and Rocephin at this time. Patient will probably need to be tested for Covid when she gets over to our [address]:         CRITICAL CARE TIME   Total CriticalCare time was 15 minutes, excluding separately reportable procedures. There was a high probability of clinically significant/life threatening deterioration in the patient's condition which required my urgent intervention. CONSULTS:  None    PROCEDURES:  Unless otherwise noted below, none     [unfilled]    FINAL IMPRESSION      1. Pneumonia of both lower lobes due to infectious organism          DISPOSITION/PLAN   DISPOSITION Admitted 07/15/2021 11:51:12 PM      PATIENT REFERRED TO:  No follow-up provider specified.     DISCHARGE MEDICATIONS:  New Prescriptions    No medications on file          (Please note:  Portions of this note were completed with a voice recognition program.Efforts were made to edit the dictations but occasionally words and phrases are mis-transcribed.)  Form v2016. J.5-cn    [employer] Courser MD ANNMARIE (electronically signed)  Emergency Medicine Provider        Bienvenido Shelton MD  07/16/21 2912

## 2021-07-16 NOTE — PROGRESS NOTES
Physician Progress Note      Mick Mann  CSN #:                  [id number]  :                       1970  ADMIT DATE:       7/15/2021 8:59 PM  [address] [place] DATE:  RESPONDING  PROVIDER #:        Lennie Caba MD          QUERY TEXT:    Patient admitted with pneumonia. Noted documentation of SIRS in H&P. Please   document if the diagnosis of SIRS has been ruled out after further study. The medical record reflects the following:  Risk Factors: none identified  Clinical Indicators: pneumonia, on arrival T 98.6 - P 122 - R 24, lactic acid   3.8, Procalcitonin 0.15, WBC 9.2, COVID not detected  Treatment: pneumonia being treated with Rocephin and Decadron    Thank you,  Casper Denny RN, BSN, CCDS  [email or web address]  Options provided:  -- SIRS was ruled out  -- Other - I will add my own diagnosis  -- Disagree - Not applicable / Not valid  -- Disagree - Clinically unable to determine / Unknown  -- Refer to Clinical Documentation Reviewer    PROVIDER RESPONSE TEXT:    SIRS was ruled out after study.     Query created by: Xavier Duvall on 2021 9:09 AM      Electronically signed by:  Lennie Caba MD 2021 1:38 PM

## 2021-07-16 NOTE — PROGRESS NOTES
Patient seen and evaluated at the bedside - continue po cefuroxime, IV fluids, discharge likely tomorrow

## 2021-07-16 NOTE — CARE COORDINATION
INITIAL CASE MANAGEMENT ASSESSMENT    Reviewed chart, met with patient to assess possible discharge needs. Explained Case Management role/services. The SW Student talked to patient via telephone. Living Situation: The patient confirmed address. The patient states that she lives alone in an apartment . She has no pets and she does not uses any steps before entering her home. ADLs: independent      DME: The patient has a walker but she does not use it. PT/OT Recs: Prior to medical admission, the patient was walking for not long periods of time. The patient refused physical therapy suggestion. Active Services: The patient has no active services at this time. Transportation: The patient is not an active  and the patient states that her  daughter will be picking up the patient at discharge. Medications: The patient states that she gets her medications from [address] [place] [address]. The patient has no issues with affordability but she states that sometimes she has difficulty with getting there. PCP: GERARDO Almeida CNP      HD/PD: N/A     PLAN/COMMENTS: 1)Give patient  Pharmacy list for delivery. 2) Discharge Home. SW/CM provided contact information for patient or family to call with any questions. SW/CM will follow and assist as needed.     Vesta Perez (MSW intern)  [facility] [place]  Phone [phone]  Fax [phone]  Electronically signed by Adelaide Harris on 7/16/2021 at 12:28 PM

## 2021-07-16 NOTE — PROGRESS NOTES
CLINICAL PHARMACY NOTE: MEDS TO BEDS    Total # of Prescriptions Filled: 1   The following medications were delivered to the patient:  Current Discharge Medication List      START taking these medications    Details   cefUROXime (CEFTIN) 500 MG tablet Take 1 tablet by mouth every 12 hours for 10 days  Qty: 20 tablet, Refills: 0         ·   ·     Additional Documentation:  Med in inpatient pharmacy for weekend delivery

## 2021-07-16 NOTE — ED NOTES
Nurse tried to clarify patient drinking status and when she last took b/p meds. Patient reports her last drink was 3 days ago. She denies a drinking problem. She states, Aneesh Carpenter took me off my blood pressure medication before my open heart because my blood pressure went real low. \"     Rajan Gill RN  07/16/21 [employer] [place], RN  07/16/21 9901

## 2021-07-16 NOTE — ED NOTES
Patient reports her blood pressure is always high. The doctor took her off her blood pressure medications.       Karen Aparicio RN  07/16/21 6904

## 2021-07-16 NOTE — PROGRESS NOTES
Pt admitted to 4250 from [facility] ED. Pt is A&O x 4, denies SOB. Pt c/o headache. /118 . Secure message sent to Dr. Ziggy Zamudio. Pt oriented to room, call light, fall precautions and POC. Tele box #117 placed and verified with [facility]. Call light and needs in reach.   Electronically signed by Greg Starkey RN on 7/16/2021 at 2:40 AM

## 2021-07-16 NOTE — PROGRESS NOTES
4 Eyes Skin Assessment     NAME:  Melody Rondi Goltz OF BIRTH:  1970  MEDICAL RECORD NUMBER:  [id number]    The patient is being assess for  Admission    I agree that 2 RN's have performed a thorough Head to Toe Skin Assessment on the patient. ALL assessment sites listed below have been assessed. Areas assessed by both nurses:    Head, Face, Ears, Shoulders, Back, Chest, Arms, Elbows, Hands, Sacrum. Buttock, Coccyx, Ischium and Legs. Feet and Heels        Does the Patient have a Wound?  No noted wound(s)       Bienvenido Prevention initiated:  No   Wound Care Orders initiated:  No    Pressure Injury (Stage 3,4, Unstageable, DTI, NWPT, and Complex wounds) if present place consult order under [de-identified] No    New and Established Ostomies if present place consult order under : No      Nurse 1 eSignature: Electronically signed by Rose Marie Correa RN on 7/16/21 at 6:04 AM EDT    **SHARE this note so that the co-signing nurse is able to place an eSignature**    Nurse 2 eSignature: Electronically signed by Emily Denver, RN on 7/16/21 at 6:12 AM EDT

## 2021-07-16 NOTE — H&P
Hospital Medicine History & Physical      PCP: Erick Fall MD    Date of Admission: 7/15/2021    Date of Service: Pt seen/examined on 7/15/2021 and Admitted to Inpatient with expected LOS greater than two midnights due to medical therapy     Chief Complaint:  SOB      History Of Present Illness:       45 y.o. female presents with a 2 week history of a dry cough, fatigue. She denies chest pain, calf pain, leg swelling. 2 days ago she noted onset of chills, felt feverish with nausea non bilious/bloody emesis, anorexia and watery brown diarrhea. She denies abdominal pain. She presented to the ER tachycardic, sob, currently improved/ stable on room air. Past Medical History:          Diagnosis Date    Alcohol abuse     Anxiety     Aortic aneurysm with dissection (HCC)     repair done 3/20/18    Asthma     Brain aneurysm     with repair 5/20/16    Headache     Hypertension        Past Surgical History:          Procedure Laterality Date    BRAIN SURGERY      CARDIAC SURGERY      FRACTURE SURGERY Left     Ankle    TUBAL LIGATION         Medications Prior to Admission:      Prior to Admission medications    Medication Sig Start Date End Date Taking?  Authorizing Provider   aspirin 81 MG EC tablet Take 81 mg by mouth daily   Yes Historical Provider, MD   budesonide-formoterol (SYMBICORT) 160-4.5 MCG/ACT AERO Inhale 2 puffs into the lungs 2 times daily 6/7/21  Yes Historical Provider, MD   DULoxetine (CYMBALTA) 60 MG extended release capsule Take 60 mg by mouth daily 6/7/21  Yes Historical Provider, MD   hydrOXYzine (VISTARIL) 50 MG capsule Take 50 mg by mouth 3 times daily as needed 6/7/21  Yes Historical Provider, MD   omeprazole (PRILOSEC) 40 MG delayed release capsule Take 40 mg by mouth daily 6/7/21  Yes Historical Provider, MD   amitriptyline (ELAVIL) 10 MG tablet Take 10 mg by mouth nightly   Yes Historical Provider, MD   ibuprofen (ADVIL;MOTRIN) 200 MG CAPS Take 400 mg by mouth every 6 hours as needed    Historical Provider, MD   albuterol sulfate  (90 Base) MCG/ACT inhaler Inhale 2 puffs into the lungs every 6 hours as needed 6/7/21   Historical Provider, MD   albuterol sulfate  (90 BASE) MCG/ACT inhaler Inhale 2 puffs into the lungs every 6 hours as needed for Wheezing    Historical Provider, MD       Allergies:  Eggs or egg-derived products and Penicillins    Social History:           TOBACCO:   reports that she quit smoking about 5 weeks ago. Her smoking use included cigarettes. She smoked 0.50 packs per day. She has never used smokeless tobacco.  ETOH:   reports current alcohol use. Family History:           History reviewed. No pertinent family history. REVIEW OF SYSTEMS:   Pertinent positives as noted in the HPI. All other systems reviewed and negative. PHYSICAL EXAM PERFORMED:    BP (!) 176/120   Pulse 99   Temp 98.6 °F (37 °C) (Oral)   Resp 18   Ht 5' 7\" (1.702 m)   Wt 168 lb 14 oz (76.6 kg)   SpO2 96%   BMI 26.45 kg/m²     General appearance:  No apparent distress, appears stated age and cooperative. HEENT:  Normal cephalic, atraumatic without obvious deformity. Pupils equal, round, and  xtra ocular muscles intact. Conjunctivae/corneas clear. Neck: Supple, with full range of motion. No jugular venous distention. Trachea midline. Respiratory:  Normal respiratory effort. No use o faccessory muscles, no intercostal retractions  Cardiovascular:  Regular rate and rhythm , no ectopy  Abdomen: Soft, non-tender, non-distended with normal bowel sounds. Musculoskeletal:  No clubbing, cyanosis or edema bilaterally. No calf tenderenss  Skin: Skin color, texture, turgor normal   Neurologic:   Neurovascularly intact without any focal sensory/motor deficits.  Cranial nerves: II-XII intact, grossly non-focal.  Psychiatric:  Alert and oriented, thought content appropriate, normal insight         Labs:     Recent Labs     07/15/21  2134 07/16/21  0433   WBC 9.2 10.1   HGB 17.8* 17.2*   HCT 53.5* 49.7*    179     Recent Labs     07/15/21  2134      K 3.6   CL 91*   CO2 30   BUN 8   CREATININE 0.8   CALCIUM 9.1     No results for input(s): AST, ALT, BILIDIR, BILITOT, ALKPHOS in the last 72 hours. No results for input(s): INR in the last 72 hours. No results for input(s): Evangelista Breeze in the last 72 hours. Urinalysis:      Lab Results   Component Value Date    NITRU Negative 08/21/2017    WBCUA 6-10 11/03/2011    BACTERIA Rare 11/03/2011    RBCUA TNTC (H) 11/03/2011    BLOODU Negative 08/21/2017    SPECGRAV 1.025 08/21/2017    GLUCOSEU Negative 08/21/2017    GLUCOSEU Negative 11/03/2011       Radiology:          CT CHEST PULMONARY EMBOLISM W CONTRAST   Final Result   1. Findings above consistent with multifocal pneumonia, essentially within   the right lung. Follow-up is recommended to document resolution. 2. No clear evidence for central pulmonary embolus. 3. Aortic valvular prosthesis. Evidence of prior aortic repair. Prior   median sternotomy and CABG. Coronary artery disease. Atheromatous plaque   and atherosclerotic calcification of the aorta and branch vasculature. 4. Old granulomatous disease. 5. Fatty liver. 6. Small hiatal hernia. XR CHEST (2 VW)   Final Result   No acute cardiopulmonary disease. ASSESSMENT:    Active Hospital Problems    Diagnosis Date Noted    Pneumonia [J18.9] 07/15/2021    PNA (pneumonia) [J18.9] 07/15/2021         PLAN:    SIRS  - r/o covid, remains on room air  - CAP abx coverage  - decadron x 1 given  - trend lactic acid    CAD  - clinically stable, no chest pain      DVT Prophylaxis: lovenox  Diet: ADULT DIET; Regular  Code Status: Full Aston Schneider MD    Thank you Barbara Berry MD for the opportunity to be involved in this patient's care. If you have any questions or concerns please feel free to contact me at [phone].

## 2021-07-16 NOTE — ED NOTES
Report called to [address] [place] [address] on 81 Gay Kapoor here to take patient to [facility] AT [address]  07/16/21 0457

## 2021-07-16 NOTE — ED NOTES
Dr. Tigre Funk called back talked with Dr. Naima Curtis. Patient is accepted at [facility] AT [place]. Patient will be going to room 5121.       Александр Monroy RN  07/15/21 3190

## 2021-07-16 NOTE — ED NOTES
Talked with Uriel Jeff at [facility]. She is moving patient to room 4250.  ETA 1  Blaise Weldon, RN  07/16/21 3704

## 2021-07-16 NOTE — PLAN OF CARE
Problem: Pain:  Goal: Pain level will decrease  Description: Pain level will decrease  7/16/2021 1331 by Kavitha Rojo RN  Outcome: Ongoing     Problem: Pain:  Goal: Control of acute pain  Description: Control of acute pain  7/16/2021 1331 by Kavitha Rojo RN  Outcome: Ongoing     Problem: Pain:  Goal: Control of chronic pain  Description: Control of chronic pain  7/16/2021 1331 by Kavitha Rojo RN  Outcome: Ongoing     Problem: Falls - Risk of:  Goal: Will remain free from falls  Description: Will remain free from falls  7/16/2021 1331 by Kavitha Rojo RN  Outcome: Ongoing     Problem: Falls - Risk of:  Goal: Absence of physical injury  Description: Absence of physical injury  7/16/2021 1331 by Kavitha Rojo RN  Outcome: Ongoing     Problem: Discharge Planning:  Goal: Discharged to appropriate level of care  Description: Discharged to appropriate level of care  7/16/2021 1331 by Kavitha Rojo RN  Outcome: Ongoing     Problem: Discharge Planning:  Goal: Participates in care planning  Description: Participates in care planning  7/16/2021 1331 by Kavitha Rojo RN  Outcome: Ongoing     Problem: Airway Clearance - Ineffective:  Goal: Clear lung sounds  Description: Clear lung sounds  7/16/2021 1331 by Kavitha Rojo RN  Outcome: Ongoing

## 2021-07-16 NOTE — PROGRESS NOTES
Medication Reconciliation    List of medications patient is currently taking is complete. Source of information: 1. Conversation with patient                                      2. EPIC records      Allergies  Eggs or egg-derived products and Penicillins     Notes regarding home medications:   1. Amitriptyline dose updated to 50 mg nightly.

## 2021-07-16 NOTE — PLAN OF CARE
Problem: Pain:  Goal: Pain level will decrease  Description: Pain level will decrease  Outcome: Ongoing  Goal: Control of acute pain  Description: Control of acute pain  Outcome: Ongoing  Goal: Control of chronic pain  Description: Control of chronic pain  Outcome: Ongoing     Problem: Falls - Risk of:  Goal: Will remain free from falls  Description: Will remain free from falls  Outcome: Ongoing  Goal: Absence of physical injury  Description: Absence of physical injury  Outcome: Ongoing     Problem: Discharge Planning:  Goal: Discharged to appropriate level of care  Description: Discharged to appropriate level of care  Outcome: Ongoing  Goal: Participates in care planning  Description: Participates in care planning  Outcome: Ongoing     Problem: Airway Clearance - Ineffective:  Goal: Clear lung sounds  Description: Clear lung sounds  Outcome: Ongoing

## 2021-07-16 NOTE — ED NOTES
Patient denies any alcohol abuse. She told nurse she drinks 2 x week. Noted in her past encounters she has history of alcohol abuse.  She reported she quit drinking Jan 1, 2021      Bettye Luna RN  07/16/21 7080

## 2021-07-17 ENCOUNTER — APPOINTMENT (OUTPATIENT)
Dept: GENERAL RADIOLOGY | Age: 51
DRG: 139 | End: 2021-07-17
Payer: MEDICAID

## 2021-07-17 VITALS
RESPIRATION RATE: 18 BRPM | HEART RATE: 95 BPM | SYSTOLIC BLOOD PRESSURE: 140 MMHG | BODY MASS INDEX: 27.92 KG/M2 | WEIGHT: 177.91 LBS | HEIGHT: 67 IN | TEMPERATURE: 98.1 F | OXYGEN SATURATION: 95 % | DIASTOLIC BLOOD PRESSURE: 82 MMHG

## 2021-07-17 LAB
ANION GAP SERPL CALCULATED.3IONS-SCNC: 14 MMOL/L (ref 3–16)
BUN BLDV-MCNC: 5 MG/DL (ref 7–20)
CALCIUM SERPL-MCNC: 7.7 MG/DL (ref 8.3–10.6)
CHLORIDE BLD-SCNC: 98 MMOL/L (ref 99–110)
CO2: 22 MMOL/L (ref 21–32)
CREAT SERPL-MCNC: 0.6 MG/DL (ref 0.6–1.1)
GFR AFRICAN AMERICAN: >60
GFR NON-AFRICAN AMERICAN: >60
GLUCOSE BLD-MCNC: 122 MG/DL (ref 70–99)
HCT VFR BLD CALC: 44 % (ref 36–48)
HEMOGLOBIN: 15.3 G/DL (ref 12–16)
LACTIC ACID: 3.8 MMOL/L (ref 0.4–2)
MCH RBC QN AUTO: 34.3 PG (ref 26–34)
MCHC RBC AUTO-ENTMCNC: 34.7 G/DL (ref 31–36)
MCV RBC AUTO: 98.9 FL (ref 80–100)
PDW BLD-RTO: 15.4 % (ref 12.4–15.4)
PLATELET # BLD: 195 K/UL (ref 135–450)
PMV BLD AUTO: 8.7 FL (ref 5–10.5)
POTASSIUM REFLEX MAGNESIUM: 3.6 MMOL/L (ref 3.5–5.1)
RBC # BLD: 4.45 M/UL (ref 4–5.2)
SODIUM BLD-SCNC: 134 MMOL/L (ref 136–145)
WBC # BLD: 7.4 K/UL (ref 4–11)

## 2021-07-17 PROCEDURE — 96361 HYDRATE IV INFUSION ADD-ON: CPT

## 2021-07-17 PROCEDURE — 96372 THER/PROPH/DIAG INJ SC/IM: CPT

## 2021-07-17 PROCEDURE — 71045 X-RAY EXAM CHEST 1 VIEW: CPT

## 2021-07-17 PROCEDURE — G0378 HOSPITAL OBSERVATION PER HR: HCPCS

## 2021-07-17 PROCEDURE — 94760 N-INVAS EAR/PLS OXIMETRY 1: CPT

## 2021-07-17 PROCEDURE — 85027 COMPLETE CBC AUTOMATED: CPT

## 2021-07-17 PROCEDURE — 6370000000 HC RX 637 (ALT 250 FOR IP): Performed by: HOSPITALIST

## 2021-07-17 PROCEDURE — 36415 COLL VENOUS BLD VENIPUNCTURE: CPT

## 2021-07-17 PROCEDURE — 6370000000 HC RX 637 (ALT 250 FOR IP): Performed by: INTERNAL MEDICINE

## 2021-07-17 PROCEDURE — 83605 ASSAY OF LACTIC ACID: CPT

## 2021-07-17 PROCEDURE — 6360000002 HC RX W HCPCS: Performed by: HOSPITALIST

## 2021-07-17 PROCEDURE — 2580000003 HC RX 258: Performed by: INTERNAL MEDICINE

## 2021-07-17 PROCEDURE — 80048 BASIC METABOLIC PNL TOTAL CA: CPT

## 2021-07-17 PROCEDURE — 6360000002 HC RX W HCPCS: Performed by: INTERNAL MEDICINE

## 2021-07-17 PROCEDURE — 96376 TX/PRO/DX INJ SAME DRUG ADON: CPT

## 2021-07-17 PROCEDURE — 94640 AIRWAY INHALATION TREATMENT: CPT

## 2021-07-17 RX ADMIN — DEXAMETHASONE SODIUM PHOSPHATE 6 MG: 10 INJECTION, SOLUTION INTRAMUSCULAR; INTRAVENOUS at 00:46

## 2021-07-17 RX ADMIN — HYDRALAZINE HYDROCHLORIDE 10 MG: 20 INJECTION INTRAMUSCULAR; INTRAVENOUS at 14:11

## 2021-07-17 RX ADMIN — ACETAMINOPHEN 650 MG: 325 TABLET ORAL at 08:31

## 2021-07-17 RX ADMIN — ENOXAPARIN SODIUM 30 MG: 30 INJECTION SUBCUTANEOUS at 08:31

## 2021-07-17 RX ADMIN — DULOXETINE HYDROCHLORIDE 60 MG: 60 CAPSULE, DELAYED RELEASE ORAL at 08:31

## 2021-07-17 RX ADMIN — BUDESONIDE AND FORMOTEROL FUMARATE DIHYDRATE 2 PUFF: 160; 4.5 AEROSOL RESPIRATORY (INHALATION) at 08:04

## 2021-07-17 RX ADMIN — SODIUM CHLORIDE: 9 INJECTION, SOLUTION INTRAVENOUS at 05:05

## 2021-07-17 RX ADMIN — CEFUROXIME AXETIL 500 MG: 250 TABLET ORAL at 08:31

## 2021-07-17 RX ADMIN — ASPIRIN 81 MG: 81 TABLET, COATED ORAL at 08:31

## 2021-07-17 RX ADMIN — PANTOPRAZOLE SODIUM 40 MG: 40 TABLET, DELAYED RELEASE ORAL at 05:05

## 2021-07-17 RX ADMIN — HYDRALAZINE HYDROCHLORIDE 10 MG: 20 INJECTION INTRAMUSCULAR; INTRAVENOUS at 00:46

## 2021-07-17 ASSESSMENT — PAIN SCALES - GENERAL
PAINLEVEL_OUTOF10: 2
PAINLEVEL_OUTOF10: 0
PAINLEVEL_OUTOF10: 4

## 2021-07-17 ASSESSMENT — PAIN DESCRIPTION - PROGRESSION: CLINICAL_PROGRESSION: NOT CHANGED

## 2021-07-17 ASSESSMENT — PAIN - FUNCTIONAL ASSESSMENT: PAIN_FUNCTIONAL_ASSESSMENT: ACTIVITIES ARE NOT PREVENTED

## 2021-07-17 ASSESSMENT — PAIN DESCRIPTION - DESCRIPTORS: DESCRIPTORS: HEADACHE

## 2021-07-17 ASSESSMENT — PAIN DESCRIPTION - FREQUENCY: FREQUENCY: CONTINUOUS

## 2021-07-17 ASSESSMENT — PAIN DESCRIPTION - PAIN TYPE: TYPE: ACUTE PAIN

## 2021-07-17 ASSESSMENT — PAIN DESCRIPTION - LOCATION: LOCATION: HEAD

## 2021-07-17 ASSESSMENT — PAIN DESCRIPTION - ONSET: ONSET: ON-GOING

## 2021-07-17 NOTE — DISCHARGE SUMMARY
Hospital Medicine Discharge Summary    Patient ID: Alyssa Mendes      Patient's PCP: Krystal Sanford, APRN - CNP    Admit Date: 7/15/2021     Discharge Date:      Admitting Physician: Swayer Nunez MD     Discharge Physician: Tameka Carolina MD     Discharge Diagnoses: Active Hospital Problems    Diagnosis Date Noted    Pneumonia [J18.9] 07/15/2021    PNA (pneumonia) [J18.9] 07/15/2021       The patient was seen and examined on day of discharge and this discharge summary is in conjunction with any daily progress note from day of discharge. Condition at discharge - stable    Hospital Course: patient seen and evaluated on the day of discharge. Patient informed about following up with appointments. Patient verbalized understanding for follow-up appointments. The patient and / or the family were informed of the results of tests, a time was given to answer questions, a plan was proposed and they agreed with plan. Medical reconciliation performed. Patient discharged stable condition. COVID negative, discharged on empirical abx for CAP, patient verbalized understanding that she is responsible for making appointment with her PCP and repeat lactic acid level      SIRS - resolved     CAD  - clinically stable, no chest pain      Exam:     BP (!) 177/108   Pulse 95   Temp 98.1 °F (36.7 °C) (Oral)   Resp 18   Ht 5' 7\" (1.702 m)   Wt 177 lb 14.6 oz (80.7 kg)   SpO2 95%   BMI 27.86 kg/m²     General appearance: No apparent distress  HEENT:  Conjunctivae/corneas clear. Neck: Supple, No jugular venous distention. Respiratory:  Normal respiratory effort. Clear to auscultation, bilaterally without Rales/Wheezes/Rhonchi. Cardiovascular: Regular rate and rhythm with normal S1/S2 without murmurs, rubs or gallops. Abdomen: Soft, non-tender, non-distended, normal bowel sounds. Musculoskelatal: No clubbing, cyanosis or edema bilaterally.    Skin: Skin color, texture, turgor normal.   Neurologic: no

## 2021-07-17 NOTE — PLAN OF CARE
Nutrition Problem #1: No nutrition diagnosis at this time  Intervention: Food and/or Nutrient Delivery: Continue Current Diet  Nutritional Goals: PO intake greater than 50%

## 2021-07-17 NOTE — PLAN OF CARE
Problem: Pain:  Goal: Pain level will decrease  Description: Pain level will decrease  7/16/2021 2148 by Poppy Holland RN  Outcome: Ongoing  7/16/2021 1331 by Fanny Cha RN  Outcome: Ongoing     Problem: Pain:  Goal: Control of acute pain  Description: Control of acute pain  7/16/2021 2148 by Poppy Holland RN  Outcome: Ongoing  7/16/2021 1331 by Fanny Cha RN  Outcome: Ongoing    Pain/discomfort being managed with PRN analgesics per MD orders. Pt able to express presence and absence of pain and rate pain appropriately using numerical scale. Non-pharmacologic comfort measures implemented. Rest and comfort promoted. Problem: Falls - Risk of:  Goal: Will remain free from falls  Description: Will remain free from falls  7/16/2021 2148 by Poppy Holland RN  Outcome: Ongoing  7/16/2021 1331 by Fanny Cha RN  Outcome: Ongoing     Problem: Falls - Risk of:  Goal: Absence of physical injury  Description: Absence of physical injury  7/16/2021 2148 by Poppy Holland RN  Outcome: Ongoing  7/16/2021 1331 by Fanny Cha RN  Outcome: Ongoing    Patient uses call light appropriately to express needs. Bed to lowest position with door open and call light in reach. All fall precautions implemented at this time. Siderails up x2. Non skid footwear in place. Seen at intervals to ensure safety. All needs attended.       Problem: Discharge Planning:  Goal: Discharged to appropriate level of care  Description: Discharged to appropriate level of care  7/16/2021 2148 by Poppy Holland RN  Outcome: Ongoing  7/16/2021 1331 by Fanny Cha RN  Outcome: Ongoing     Problem: Discharge Planning:  Goal: Participates in care planning  Description: Participates in care planning  7/16/2021 2148 by Poppy Holland RN  Outcome: Ongoing  7/16/2021 1331 by Fanny Cha RN  Outcome: Ongoing

## 2021-07-17 NOTE — PROGRESS NOTES
Ceftin prescription sent to [facility]. Retail pharmacy closed until Monday. Prescription called to [facility] [phone]. Made Pt aware. Will continue to monitor.

## 2021-07-17 NOTE — PROGRESS NOTES
Comprehensive Nutrition Assessment    Type and Reason for Visit:  Initial, Positive Nutrition Screen    Nutrition Recommendations/Plan:   - Continue current diet  - Monitor intakes    Nutrition Assessment:  Positive nutrition screen for wt loss. Unable to assess wt loss. Wt hx not provided in EMR and pt is currently under droplet plus isolation. Currently on regular diet with recorded intakes %. No nutrition concerns at this time but will continue to monitor. Malnutrition Assessment:  Malnutrition Status:  Insufficient data    Context:  Chronic Illness       Estimated Daily Nutrient Needs:  Energy (kcal):  8689-2465 (20-25kcal/81kg); Weight Used for Energy Requirements:  Current     Protein (g):  73-85 (1.2-1.4g/61kg); Weight Used for Protein Requirements:  Ideal        Fluid (ml/day):  1 ml/kcal      Nutrition Related Findings:  Trace BLE edema. Wounds:  None       Current Nutrition Therapies:    ADULT DIET; Regular    Anthropometric Measures:  · Height: 5' 7\" (170.2 cm)  · Current Body Weight: 177 lb (80.3 kg)   · Admission Body Weight: 169 lb (76.7 kg)      · Ideal Body Weight: 135 lbs; % Ideal Body Weight 131.1 %   · BMI: 27.7  · BMI Categories: Overweight (BMI 25.0-29. 9)       Nutrition Diagnosis:   No nutrition diagnosis at this time   Nutrition Interventions:   Food and/or Nutrient Delivery:  Continue Current Diet  Nutrition Education/Counseling:  Education not indicated   Coordination of Nutrition Care:  Continue to monitor while inpatient    Goals:  PO intake greater than 50%       Nutrition Monitoring and Evaluation:   Behavioral-Environmental Outcomes:  None Identified   Food/Nutrient Intake Outcomes:  Food and Nutrient Intake  Physical Signs/Symptoms Outcomes:  Weight     Discharge Planning:     Too soon to determine     Electronically signed by Thee Blancas RD, LD on 7/17/21 at 10:24 AM EDT    Contact: [phone]

## 2021-07-20 LAB
BLOOD CULTURE, ROUTINE: NORMAL
CULTURE, BLOOD 2: NORMAL

## 2021-10-26 ENCOUNTER — APPOINTMENT (OUTPATIENT)
Dept: CT IMAGING | Age: 51
End: 2021-10-26
Payer: MEDICAID

## 2021-10-26 ENCOUNTER — HOSPITAL ENCOUNTER (EMERGENCY)
Age: 51
Discharge: HOME | End: 2021-10-26
Attending: EMERGENCY MEDICINE
Payer: MEDICAID

## 2021-10-26 VITALS
HEART RATE: 105 BPM | TEMPERATURE: 97.8 F | WEIGHT: 162.92 LBS | BODY MASS INDEX: 25.57 KG/M2 | RESPIRATION RATE: 16 BRPM | SYSTOLIC BLOOD PRESSURE: 116 MMHG | DIASTOLIC BLOOD PRESSURE: 79 MMHG | HEIGHT: 67 IN | OXYGEN SATURATION: 98 %

## 2021-10-26 DIAGNOSIS — M79.604 RIGHT LEG PAIN: Primary | ICD-10-CM

## 2021-10-26 DIAGNOSIS — M54.17 LUMBOSACRAL RADICULOPATHY AT L4: ICD-10-CM

## 2021-10-26 DIAGNOSIS — E87.6 HYPOKALEMIA: ICD-10-CM

## 2021-10-26 LAB
A/G RATIO: 0.9 (ref 1.1–2.2)
ALBUMIN SERPL-MCNC: 3.2 G/DL (ref 3.4–5)
ALP BLD-CCNC: 186 U/L (ref 40–129)
ALT SERPL-CCNC: 22 U/L (ref 10–40)
ANION GAP SERPL CALCULATED.3IONS-SCNC: 13 MMOL/L (ref 3–16)
AST SERPL-CCNC: 128 U/L (ref 15–37)
BASOPHILS ABSOLUTE: 0 K/UL (ref 0–0.2)
BASOPHILS RELATIVE PERCENT: 0 %
BILIRUB SERPL-MCNC: 2.1 MG/DL (ref 0–1)
BUN BLDV-MCNC: 6 MG/DL (ref 7–20)
CALCIUM SERPL-MCNC: 8.7 MG/DL (ref 8.3–10.6)
CHLORIDE BLD-SCNC: 94 MMOL/L (ref 99–110)
CHP ED QC CHECK: YES
CO2: 29 MMOL/L (ref 21–32)
CREAT SERPL-MCNC: 0.5 MG/DL (ref 0.6–1.1)
D DIMER: 1.15 UG/ML FEU
EOSINOPHILS ABSOLUTE: 0.1 K/UL (ref 0–0.6)
EOSINOPHILS RELATIVE PERCENT: 1 %
GFR AFRICAN AMERICAN: >60
GFR NON-AFRICAN AMERICAN: >60
GLOBULIN: 3.4 G/DL
GLUCOSE BLD-MCNC: 115 MG/DL
GLUCOSE BLD-MCNC: 115 MG/DL (ref 70–99)
GLUCOSE BLD-MCNC: 124 MG/DL (ref 70–99)
HCT VFR BLD CALC: 39.8 % (ref 36–48)
HEMOGLOBIN: 13.7 G/DL (ref 12–16)
LYMPHOCYTES ABSOLUTE: 1.7 K/UL (ref 1–5.1)
LYMPHOCYTES RELATIVE PERCENT: 14.7 %
MAGNESIUM: 1.8 MG/DL (ref 1.8–2.4)
MCH RBC QN AUTO: 35.3 PG (ref 26–34)
MCHC RBC AUTO-ENTMCNC: 34.4 G/DL (ref 31–36)
MCV RBC AUTO: 102.7 FL (ref 80–100)
MONOCYTES ABSOLUTE: 0.8 K/UL (ref 0–1.3)
MONOCYTES RELATIVE PERCENT: 6.6 %
NEUTROPHILS ABSOLUTE: 9.3 K/UL (ref 1.7–7.7)
NEUTROPHILS RELATIVE PERCENT: 77.7 %
PDW BLD-RTO: 15.6 % (ref 12.4–15.4)
PERFORMED ON: ABNORMAL
PLATELET # BLD: 241 K/UL (ref 135–450)
PMV BLD AUTO: 9.3 FL (ref 5–10.5)
POTASSIUM REFLEX MAGNESIUM: 2.6 MMOL/L (ref 3.5–5.1)
RBC # BLD: 3.87 M/UL (ref 4–5.2)
SEDIMENTATION RATE, ERYTHROCYTE: 17 MM/HR (ref 0–30)
SODIUM BLD-SCNC: 136 MMOL/L (ref 136–145)
TOTAL CK: 57 U/L (ref 26–192)
TOTAL PROTEIN: 6.6 G/DL (ref 6.4–8.2)
WBC # BLD: 11.9 K/UL (ref 4–11)

## 2021-10-26 PROCEDURE — 70450 CT HEAD/BRAIN W/O DYE: CPT

## 2021-10-26 PROCEDURE — 82550 ASSAY OF CK (CPK): CPT

## 2021-10-26 PROCEDURE — 99284 EMERGENCY DEPT VISIT MOD MDM: CPT

## 2021-10-26 PROCEDURE — 72131 CT LUMBAR SPINE W/O DYE: CPT

## 2021-10-26 PROCEDURE — 85379 FIBRIN DEGRADATION QUANT: CPT

## 2021-10-26 PROCEDURE — 80053 COMPREHEN METABOLIC PANEL: CPT

## 2021-10-26 PROCEDURE — 6370000000 HC RX 637 (ALT 250 FOR IP): Performed by: EMERGENCY MEDICINE

## 2021-10-26 PROCEDURE — 82947 ASSAY GLUCOSE BLOOD QUANT: CPT

## 2021-10-26 PROCEDURE — 85025 COMPLETE CBC W/AUTO DIFF WBC: CPT

## 2021-10-26 PROCEDURE — 85652 RBC SED RATE AUTOMATED: CPT

## 2021-10-26 PROCEDURE — 36415 COLL VENOUS BLD VENIPUNCTURE: CPT

## 2021-10-26 PROCEDURE — 83735 ASSAY OF MAGNESIUM: CPT

## 2021-10-26 RX ORDER — POTASSIUM CHLORIDE 20 MEQ/1
40 TABLET, EXTENDED RELEASE ORAL ONCE
Status: DISCONTINUED | OUTPATIENT
Start: 2021-10-26 | End: 2021-10-26 | Stop reason: HOSPADM

## 2021-10-26 RX ORDER — PREDNISONE 20 MG/1
40 TABLET ORAL DAILY
Qty: 14 TABLET | Refills: 0 | Status: SHIPPED | OUTPATIENT
Start: 2021-10-26 | End: 2021-11-02

## 2021-10-26 RX ORDER — QUETIAPINE FUMARATE 25 MG/1
TABLET, FILM COATED ORAL
COMMUNITY
Start: 2021-10-14

## 2021-10-26 RX ORDER — ACETAMINOPHEN 500 MG
1000 TABLET ORAL ONCE
Status: COMPLETED | OUTPATIENT
Start: 2021-10-26 | End: 2021-10-26

## 2021-10-26 RX ORDER — POTASSIUM CHLORIDE 20 MEQ/1
20 TABLET, EXTENDED RELEASE ORAL 2 TIMES DAILY
Qty: 28 TABLET | Refills: 1 | Status: SHIPPED | OUTPATIENT
Start: 2021-10-26 | End: 2021-11-09

## 2021-10-26 RX ORDER — PREDNISONE 20 MG/1
60 TABLET ORAL ONCE
Status: COMPLETED | OUTPATIENT
Start: 2021-10-26 | End: 2021-10-26

## 2021-10-26 RX ADMIN — APIXABAN 80 MG: 5 TABLET, FILM COATED ORAL at 19:13

## 2021-10-26 RX ADMIN — PREDNISONE 60 MG: 20 TABLET ORAL at 19:31

## 2021-10-26 RX ADMIN — APIXABAN 10 MG: 5 TABLET, FILM COATED ORAL at 19:12

## 2021-10-26 RX ADMIN — ACETAMINOPHEN 1000 MG: 500 TABLET ORAL at 19:31

## 2021-10-26 ASSESSMENT — PAIN DESCRIPTION - LOCATION
LOCATION: LEG
LOCATION: LEG

## 2021-10-26 ASSESSMENT — PAIN SCALES - GENERAL
PAINLEVEL_OUTOF10: 8

## 2021-10-26 ASSESSMENT — PAIN DESCRIPTION - PAIN TYPE
TYPE: ACUTE PAIN
TYPE: CHRONIC PAIN

## 2021-10-26 ASSESSMENT — PAIN DESCRIPTION - DESCRIPTORS: DESCRIPTORS: THROBBING;CONSTANT

## 2021-10-26 ASSESSMENT — PAIN DESCRIPTION - FREQUENCY: FREQUENCY: CONTINUOUS

## 2021-10-26 ASSESSMENT — PAIN - FUNCTIONAL ASSESSMENT
PAIN_FUNCTIONAL_ASSESSMENT: PREVENTS OR INTERFERES SOME ACTIVE ACTIVITIES AND ADLS
PAIN_FUNCTIONAL_ASSESSMENT: 0-10

## 2021-10-26 ASSESSMENT — PAIN DESCRIPTION - ORIENTATION
ORIENTATION: RIGHT
ORIENTATION: RIGHT

## 2021-10-26 NOTE — ED TRIAGE NOTES
Fall risk screening completed. Fall risk bracelet applied to patient. Non-skid socks provided and placed on patient. The fall risk is indicated using  fall sign . Based on score, a bed alarm was not indicated. The call light is within the patient's reach, and instructions for use were provided. The bed is in the lowest position with wheels locked. The patient has been advised to notify staff, using the call light, if there is a need to get up or use restroom. The patient verbalized understanding of safety precautions and how to contact staff for assistance.

## 2021-10-26 NOTE — ED PROVIDER NOTES
CHIEF COMPLAINT  Chief Complaint   Patient presents with    Leg Pain     Worsening right leg pain since 10/20/21. States she has chronic bilateral leg pain for >3 years. This leg also feels heavier. No obvious swelling. Pedal pulses palpable bilateral feet. CMS intact. HISTORY OF PRESENT ILLNESS  Rama Moore is a 45 y.o. female who presents to the ED complaining of a 3-year history of bilateral leg pain that she reports started after she had aortic surgery in 2018 and a history of bilateral arm and leg numbness and tingling that is intermittent that has not changed over the course of her last 3-year history who complains of a 2-week history of feeling that her right upper leg from her hip down to her knee has been throbbing, painful and has been giving way causing her to fall several times over the last 2 weeks. Patient denies swelling or redness of the leg. No history of DVT. No knee pain or ankle pain. No claudication. No calf pain. No rash. Patient denies pain in the back. She denies incontinence or saddle paresthesias. No fevers or chills. No abdominal pain. No other complaints, modifying factors or associated symptoms. Nursing notes reviewed. Past Medical History:   Diagnosis Date    Alcohol abuse     Anxiety     Aortic aneurysm with dissection (HCC)     repair done 3/20/18    Asthma     Brain aneurysm     with repair 5/20/16    Headache     Hypertension      Past Surgical History:   Procedure Laterality Date    BRAIN SURGERY      CARDIAC SURGERY      FRACTURE SURGERY Left     Ankle    TUBAL LIGATION       History reviewed. No pertinent family history.   Social History     Socioeconomic History    Marital status:      Spouse name: Not on file    Number of children: Not on file    Years of education: Not on file    Highest education level: Not on file   Occupational History    Occupation: Nurse Aide   Tobacco Use    Smoking status: Former Smoker Packs/day: 0.50     Types: Cigarettes     Quit date: 2021     Years since quittin.4    Smokeless tobacco: Never Used   Vaping Use    Vaping Use: Never assessed   Substance and Sexual Activity    Alcohol use: Yes     Comment: Every other day usually.  Drug use: No    Sexual activity: Not on file   Other Topics Concern    Not on file   Social History Narrative    Not on file     Social Determinants of Health     Financial Resource Strain:     Difficulty of Paying Living Expenses:    Food Insecurity:     Worried About Running Out of Food in the Last Year:     [address] in the Last Year:    Transportation Needs:     Lack of Transportation (Medical):  Lack of Transportation (Non-Medical):    Physical Activity:     Days of Exercise per Week:     Minutes of Exercise per Session:    Stress:     Feeling of Stress :    Social Connections:     Frequency of Communication with Friends and Family:     Frequency of Social Gatherings with Friends and Family:     Attends J[employer] Services:     Active Member of Clubs or Organizations:     Attends Club or Organization Meetings:     Marital Status:    Intimate Partner Violence:     Fear of Current or Ex-Partner:     Emotionally Abused:     Physically Abused:     Sexually Abused:      No current facility-administered medications for this encounter.      Current Outpatient Medications   Medication Sig Dispense Refill    QUEtiapine (SEROQUEL) 25 MG tablet       potassium chloride (KLOR-CON M) 20 MEQ extended release tablet Take 1 tablet by mouth 2 times daily for 14 days 28 tablet 1    predniSONE (DELTASONE) 20 MG tablet Take 2 tablets by mouth daily for 7 days 14 tablet 0    aspirin 81 MG EC tablet Take 81 mg by mouth daily      budesonide-formoterol (SYMBICORT) 160-4.5 MCG/ACT AERO Inhale 2 puffs into the lungs 2 times daily      DULoxetine (CYMBALTA) 60 MG extended release capsule Take 60 mg by mouth daily      hydrOXYzine (VISTARIL) 50 MG capsule Take 50 mg by mouth 3 times daily as needed      omeprazole (PRILOSEC) 40 MG delayed release capsule Take 40 mg by mouth daily      albuterol sulfate  (90 Base) MCG/ACT inhaler Inhale 2 puffs into the lungs every 6 hours as needed      amitriptyline (ELAVIL) 50 MG tablet Take 50 mg by mouth nightly       ibuprofen (ADVIL;MOTRIN) 200 MG CAPS Take 400 mg by mouth every 6 hours as needed       Allergies   Allergen Reactions    Eggs Or Egg-Derived Products Itching    Penicillins Rash       REVIEW OF SYSTEMS  Positives and pertinent negatives as per HPI. Ten other systems were reviewed and are negative. Nursing notes pertaining to ROS were reviewed. PHYSICAL EXAM   /79   Pulse 105   Temp 97.8 °F (36.6 °C) (Oral)   Resp 16   Ht 5' 7\" (1.702 m)   Wt 162 lb 14.7 oz (73.9 kg)   LMP 02/07/2017   SpO2 98%   BMI 25.52 kg/m²   General: Alert and oriented x 3, NAD. No increased work of breathing or accessory muscle use. Non-ill appearing. Appropriate and interactive  Eyes: PERRL, no scleral icterus, injection or exudate. EOMI. HENT: Atraumatic. Oral pharynx is clear, moist, no enanthem. No tonsillar hypertropy or exudate. Nasal mucous membranes are clear. TM's are clear without evidence of otitis media. Neck:  No Lymphadenopathy. Non-tender to palpation. Normal ROM. No JVD. No thyromegaly. No Mass. PULMONARY: Lungs clear bilaterally without wheezes, rales or rhonchi. Good air movement bilaterally. CV: Regular rate and rhythm without murmurs, rubs or gallops. ABD: Soft, non-tender, non-distended, normal bowel sounds, no hepatosplenomegaly, no masses. No peritoneal signs, rebound or guarding. Back:  No CVAT, no rash. EXT: No cyanosis or clubbing. No rash. CR < 2 seconds. No tenderness to palpation of the right knee, ankle or foot. No lower extremity edema.     +2 pulses in upper/lower extremities bilaterally with good palpable dorsalis pedis and posterior tibial pulses of both feet with good cap refill in all the toes of both feet. Skin is warm and dry. No rash of the lower extremities. Patient has tenderness to palpation of the right hamstrings and quadriceps that reproduces her pain without palpable mass patient has normal internal and external rotation of the right hip with no pain with this maneuver. PSYCH: normal affect  NEURO: Alert and oriented x 3, NAD. Interactive. GCS 15.  CN 2-12 are intact. PERRL. EOMI. Visual fields are normal.  Fundi are sharp without papilledema. 5 of 5 LE strength that is bilaterally symmetric.  5 of 5 UE strength that is bilaterally symmetric. Normal sensation to light touch of the UE and LE.  2/4 DTR of the biceps, patellar and Achilles tendons. No clonus. Normal Romberg without pronator drift. Normal finger to nose testing without past pointing. No ataxia or truncal instability. NIHSS zero with a fluctuating and unreliable testing of strength in the right lower extremity with intermittent episodes of 5/5 strength that suddenly gives way but no evidence of cogwheel rigidity or tremulousness. Lumbar spine:  Normal sensation of the inner thigh bilaterally  Normal thigh adduction strength bilaterally  Normal knee extension bilaterally  Normal ankle and great toe dorsiflexion bilaterally  Normal Patellar 2/4 reflexes bilaterally  Normal plantarflexion of the toes bilaterally      RADIOLOGY    CT LUMBAR SPINE WO CONTRAST   Final Result   1. No acute fracture. Degenerative changes with grade 1 spondylolistheses. 2. Right lateral disc protrusion at L4-L5 with abutment of the exiting L4   nerve. 3. Other findings as described. CT Head WO Contrast   Final Result   No acute intracranial abnormality. Findings were discussed with Jf Kat at 6:28 p.m. on 10/26/2021. LABS  I have reviewed all labs for this visit.    Results for orders placed or performed during the hospital encounter of 10/26/21   CBC Auto Differential   Result Value Ref Range    WBC 11.9 (H) 4.0 - 11.0 K/uL    RBC 3.87 (L) 4.00 - 5.20 M/uL    Hemoglobin 13.7 12.0 - 16.0 g/dL    Hematocrit 39.8 36.0 - 48.0 %    .7 (H) 80.0 - 100.0 fL    MCH 35.3 (H) 26.0 - 34.0 pg    MCHC 34.4 31.0 - 36.0 g/dL    RDW 15.6 (H) 12.4 - 15.4 %    Platelets 936 038 - 651 K/uL    MPV 9.3 5.0 - 10.5 fL    Neutrophils % 77.7 %    Lymphocytes % 14.7 %    Monocytes % 6.6 %    Eosinophils % 1.0 %    Basophils % 0.0 %    Neutrophils Absolute 9.3 (H) 1.7 - 7.7 K/uL    Lymphocytes Absolute 1.7 1.0 - 5.1 K/uL    Monocytes Absolute 0.8 0.0 - 1.3 K/uL    Eosinophils Absolute 0.1 0.0 - 0.6 K/uL    Basophils Absolute 0.0 0.0 - 0.2 K/uL   Comprehensive Metabolic Panel w/ Reflex to MG   Result Value Ref Range    Sodium 136 136 - 145 mmol/L    Potassium reflex Magnesium 2.6 (LL) 3.5 - 5.1 mmol/L    Chloride 94 (L) 99 - 110 mmol/L    CO2 29 21 - 32 mmol/L    Anion Gap 13 3 - 16    Glucose 124 (H) 70 - 99 mg/dL    BUN 6 (L) 7 - 20 mg/dL    CREATININE 0.5 (L) 0.6 - 1.1 mg/dL    GFR Non-African American >60 >60    GFR African American >60 >60    Calcium 8.7 8.3 - 10.6 mg/dL    Total Protein 6.6 6.4 - 8.2 g/dL    Albumin 3.2 (L) 3.4 - 5.0 g/dL    Albumin/Globulin Ratio 0.9 (L) 1.1 - 2.2    Total Bilirubin 2.1 (H) 0.0 - 1.0 mg/dL    Alkaline Phosphatase 186 (H) 40 - 129 U/L    ALT 22 10 - 40 U/L     (H) 15 - 37 U/L    Globulin 3.4 Not Established g/dL   D-Dimer, Quantitative   Result Value Ref Range    D-Dimer, Quant 1.15 (H) <0.50 ug/mL FEU   CK   Result Value Ref Range    Total CK 57 26 - 192 U/L   Sedimentation Rate   Result Value Ref Range    Sed Rate 17 0 - 30 mm/Hr   Magnesium   Result Value Ref Range    Magnesium 1.80 1.80 - 2.40 mg/dL   POCT Glucose   Result Value Ref Range    POC Glucose 115 (H) 70 - 99 mg/dl    Performed on ACCU-CHEK    POCT Glucose   Result Value Ref Range    Glucose 115 mg/dL    QC OK?  yes            ED COURSE/MDM  Right leg pain: Physical exam of the right lower extremity reveals tenderness to palpation of the thigh and hamstring without palpable mass and no evidence of infection, abscess or cellulitis but poor reproducibility of strength with remittent episodes of intact strength followed by periods that she drops the leg to the cot. Patient has no evidence of edema or DVT on exam or signs of zoster and no signs of arterial insufficiency. Patient is D-dimer however is positive increasing  the likelihood of DVT and eliquis starter pack was initiated with LE venous doppler ordered for tomorrow. Patient does not have typical radicular symptoms and her symptoms of paresthesia and pain have been present for more than 3 years reporting that the main symptoms that have been worse is a throbbing pain in the right thigh with feeling that the thigh feels \"heavy\". CT scan of the head is unremarkable without evidence of ischemia and patient has a true NIH stroke scale of 0. Patient does not have evidence of rhabdomyolysis. CAT scan will be performed of the lumbar spine and results were pending at the time of transfer of care to Dr. Chilo Cai at [id number]. Hypokalemia:  Oral supplementation of 20meq BID x 2w with close outpatient follow up. Patient was given scripts for the following medications. I counseled patient how to take these medications. Discharge Medication List as of 10/26/2021  7:27 PM      START taking these medications    Details   potassium chloride (KLOR-CON M) 20 MEQ extended release tablet Take 1 tablet by mouth 2 times daily for 14 days, Disp-28 tablet, R-1Normal      predniSONE (DELTASONE) 20 MG tablet Take 2 tablets by mouth daily for 7 days, Disp-14 tablet, R-0Normal               CLINICAL IMPRESSION  1. Right leg pain    2. Hypokalemia    3. Lumbosacral radiculopathy at L4        Blood pressure 116/79, pulse 105, temperature 97.8 °F (36.6 °C), temperature source Oral, resp.  rate 16, height 5' 7\" (1.702 m), weight 162 lb 14.7 oz (73.9 kg), last menstrual period 02/07/2017, SpO2 98 %.       Follow-up with:  GERARDO Coello CNP  [address] [facility]  [facility] [address]  [phone]    In 3 days      Cely Vega MD  [address] [place] [address]  [phone]    Schedule an appointment as soon as possible for a visit in 1 week            Luz Elena Santos MD  11/02/21 Dmitriy Mathew

## 2021-10-26 NOTE — ED NOTES
Dr Walden Lanes made aware of patient's refusal of potassium and her statement that she will take her potassium from home at home.       Zion Tello RN  10/26/21 [id number]

## 2021-10-26 NOTE — ED PROVIDER NOTES
Care was assumed at 1900. The case was reviewed with Dr. Ganga Knight. His chart was reviewed. Lab and imaging was reviewed. At the time of turnover the only thing pending was the CT lumbar spine. Her D-dimer was elevated that he had already planned on getting a venous Doppler and starting her on Eliquis. Is hypokalemic and she is supposed to be on potassium supplementation but does not take it. She is going to begin taking her potassium supplementation. I reinforced this with her as well. Her CT lumbar spine showed a L4 disc herniation on the right impinging on the L4 nerve root which certainly could be a contributing factor to the symptoms that she is complaining of. I put her on a course of prednisone. I referred her to Dr. Poppy Oh, neurosurgery for follow-up for possible further work-up and treatment. Do suspect this could be causing her pain since she is describing pain in her hip and thigh area and episodes where her legs gives out on her. I have discussed the test results and treatment plan with the patient. She understands her treatment plan and follow-up as discussed and is agreeable. Additional diagnoses added to Dr. Juliann Islas chart: L4 radiculopathy    Disposition: Discharged home in stable condition.       Delilah Schneider MD  10/26/21 Arch Nearing

## 2021-10-26 NOTE — ED NOTES
Pt given verbal instruction and provided written instruction on how to take Eliquis at home. Pt verbalizes understanding.       Samantha Cross RN  10/26/21 1942

## 2021-10-26 NOTE — ED NOTES
Dr Karen Marte and Dr Antonella Chen reviewing lab results.       Juliocesar Stephen RN  10/26/21 [id number]

## 2021-10-28 ENCOUNTER — HOSPITAL ENCOUNTER (OUTPATIENT)
Dept: VASCULAR LAB | Age: 51
Discharge: HOME | End: 2021-10-28
Payer: MEDICAID

## 2021-10-28 ENCOUNTER — ANTI-COAG VISIT (OUTPATIENT)
Dept: PHARMACY | Age: 51
End: 2021-10-28
Payer: MEDICAID

## 2021-10-28 DIAGNOSIS — M79.604 RIGHT LEG PAIN: ICD-10-CM

## 2021-10-28 PROCEDURE — 99211 OFF/OP EST MAY X REQ PHY/QHP: CPT

## 2021-10-28 PROCEDURE — 93971 EXTREMITY STUDY: CPT

## 2021-10-28 NOTE — PROGRESS NOTES
Rama Everett was recently in the ED with concerns for a DVT. They are here today for a DVT Study to rule out a DVT. This visit was completed as a:  THIS VISIT WAS PERFORMED AS: An in person visit. Protocols were followed with precautions to reduce the spread of COVID-19. The DVT study today was found to be negative. Procedure       Type of Study:        Veins:Lower Extremities DVT Study, VL EXTREMITY VENOUS DUPLEX RIGHT.       Tech Comments   Right   No evidence of deep vein or superficial vein thrombosis involving the right   lower extremity and the left common femoral vein. Rama Everett returned the remaining Eliquis tablets that were given in the ED as part of the DVT program. These will be disposed of properly. It was recommended that they follow up with their PCP within 7 days. If the patient currently does not have a PCP, they were given a list of [facility] physicians. They were counseled on signs and symptoms of DVT and if symptoms should worsen to seek medical attention.      [address] [place] [address]  Anticoagulation Service  DVT Program  [phone]      CLINICAL PHARMACY CONSULT: MED RECONCILIATION/REVIEW ADDENDUM    For Pharmacy Admin Tracking Only     Intervention Detail: Dose Adjustment: 1, reason: Therapy De-escalation   Total # of Interventions Recommended: 1   Total # of Interventions Accepted: 1   Time Spent (min): 15